# Patient Record
Sex: FEMALE
[De-identification: names, ages, dates, MRNs, and addresses within clinical notes are randomized per-mention and may not be internally consistent; named-entity substitution may affect disease eponyms.]

---

## 2022-09-02 PROBLEM — Z00.00 ENCOUNTER FOR PREVENTIVE HEALTH EXAMINATION: Status: ACTIVE | Noted: 2022-09-02

## 2022-09-06 ENCOUNTER — APPOINTMENT (OUTPATIENT)
Dept: COLORECTAL SURGERY | Facility: CLINIC | Age: 72
End: 2022-09-06

## 2022-09-06 VITALS
HEIGHT: 62 IN | HEART RATE: 101 BPM | TEMPERATURE: 96.6 F | BODY MASS INDEX: 23.92 KG/M2 | RESPIRATION RATE: 16 BRPM | SYSTOLIC BLOOD PRESSURE: 190 MMHG | WEIGHT: 130 LBS | DIASTOLIC BLOOD PRESSURE: 100 MMHG

## 2022-09-06 DIAGNOSIS — K57.32 DIVERTICULITIS OF LARGE INTESTINE W/OUT PERFORATION OR ABSCESS W/OUT BLEEDING: ICD-10-CM

## 2022-09-06 PROCEDURE — 99203 OFFICE O/P NEW LOW 30 MIN: CPT

## 2022-09-17 PROBLEM — K57.32 SIGMOID DIVERTICULITIS: Status: ACTIVE | Noted: 2022-09-17

## 2022-09-17 NOTE — ASSESSMENT
[FreeTextEntry1] : 72 year old female with uncomplicated sigmoid diverticultis. currently doing well. recommend conservative treatment with high fiber diet, weight loss, metamucil daily. follow up with GI for colonoscopy. if she develops furthewr episodes of diverticultis then may require sigmoid colectomy.

## 2022-09-17 NOTE — REVIEW OF SYSTEMS
[As Noted in HPI] : as noted in HPI [Abdominal Pain] : abdominal pain [Negative] : Heme/Lymph [FreeTextEntry7] : had llq pain during diverticultis attack

## 2022-09-17 NOTE — CONSULT LETTER
[Dear  ___] : Dear  [unfilled], [Consult Letter:] : I had the pleasure of evaluating your patient, [unfilled]. [Please see my note below.] : Please see my note below. [Consult Closing:] : Thank you very much for allowing me to participate in the care of this patient.  If you have any questions, please do not hesitate to contact me. [Sincerely,] : Sincerely, [FreeTextEntry3] : Juancho Chavez MD

## 2022-09-17 NOTE — PHYSICAL EXAM
[Abdomen Masses] : No abdominal masses [Abdomen Tenderness] : ~T No ~M abdominal tenderness [Tender] : nontender [JVD] : no jugular venous distention  [Normal Breath Sounds] : Normal breath sounds [No Rash or Lesion] : No rash or lesion [Alert] : alert [Oriented to Person] : oriented to person [Oriented to Place] : oriented to place [Oriented to Time] : oriented to time [Calm] : calm [de-identified] : looks well nad

## 2022-09-17 NOTE — HISTORY OF PRESENT ILLNESS
[FreeTextEntry1] : consultation requested by Dr. Pringle for sigmoid diverticulitis. 72 year old female with recent episode of sigmoid diverticulitis, treated with IV and po antibiotics.